# Patient Record
Sex: MALE | Race: WHITE | URBAN - METROPOLITAN AREA
[De-identification: names, ages, dates, MRNs, and addresses within clinical notes are randomized per-mention and may not be internally consistent; named-entity substitution may affect disease eponyms.]

---

## 2023-07-18 ENCOUNTER — OFFICE VISIT (OUTPATIENT)
Facility: LOCATION | Age: 73
End: 2023-07-18
Payer: MEDICAID

## 2023-07-18 DIAGNOSIS — H26.493 OTHER SECONDARY CATARACT, BILATERAL: ICD-10-CM

## 2023-07-18 DIAGNOSIS — E11.9 DIABETES MELLITUS TYPE 2 WITHOUT MENTION OF COMPLICATION: ICD-10-CM

## 2023-07-18 DIAGNOSIS — H16.223 KERATOCONJUNCTIVITIS SICCA, BILATERAL: ICD-10-CM

## 2023-07-18 DIAGNOSIS — H43.813 VITREOUS DEGENERATION, BILATERAL: Primary | ICD-10-CM

## 2023-07-18 PROCEDURE — 92134 CPTRZ OPH DX IMG PST SGM RTA: CPT | Performed by: OPHTHALMOLOGY

## 2023-07-18 PROCEDURE — 92004 COMPRE OPH EXAM NEW PT 1/>: CPT | Performed by: OPHTHALMOLOGY

## 2023-07-18 ASSESSMENT — INTRAOCULAR PRESSURE
OS: 16
OD: 16

## 2023-07-18 ASSESSMENT — KERATOMETRY
OD: 46.25
OS: 46.00

## 2023-07-18 ASSESSMENT — VISUAL ACUITY
OS: 20/25
OD: 20/25

## 2023-07-18 NOTE — IMPRESSION/PLAN
Impression: Other secondary cataract, bilateral: H26.493. Plan: Secondary cataract is visually significant and interfering with patient's visual function. Patient desires to see better and have Capsulotomy surgery. Retina is sufficiently stable to allow safe cataract surgery. Recommend YAG Capsulotomy. Risks, benefits, and alternatives discussed with patient. Patient agrees to proceed with surgery. OD first the OS.

## 2023-07-18 NOTE — IMPRESSION/PLAN
Impression: Keratoconjunctivitis sicca, bilateral: Q62.064. Plan: Recommend use of frequent high quality artificial tears, fish and/or flaxseed oil 2000 mg BID, and ointment at bedtime.

## 2023-07-18 NOTE — IMPRESSION/PLAN
Impression: Diabetes mellitus Type 2 without mention of complication: E85.0. Plan: Systemic diabetes without sign of diabetic retinopathy on dilated retinal examination today:  Discussed the pathophysiology of diabetes and its effect on the eye. Stressed the importance of strong glucose control. Advised of importance of at least yearly dilated examinations, but to contact us immediately for any problems or concerns.

## 2023-08-03 ENCOUNTER — SURGERY (OUTPATIENT)
Facility: LOCATION | Age: 73
End: 2023-08-03
Payer: MEDICARE

## 2023-08-03 PROCEDURE — CRBAL CREDIT BALANCE: CUSTOM | Performed by: OPHTHALMOLOGY

## 2023-08-03 PROCEDURE — 66821 AFTER CATARACT LASER SURGERY: CPT | Performed by: OPHTHALMOLOGY

## 2023-08-23 ENCOUNTER — LASER (OUTPATIENT)
Facility: LOCATION | Age: 73
End: 2023-08-23
Payer: MEDICARE